# Patient Record
Sex: MALE | Race: BLACK OR AFRICAN AMERICAN | NOT HISPANIC OR LATINO | URBAN - METROPOLITAN AREA
[De-identification: names, ages, dates, MRNs, and addresses within clinical notes are randomized per-mention and may not be internally consistent; named-entity substitution may affect disease eponyms.]

---

## 2024-04-17 ENCOUNTER — OFFICE VISIT (OUTPATIENT)
Dept: PRIMARY CARE | Facility: CLINIC | Age: 29
End: 2024-04-17
Payer: COMMERCIAL

## 2024-04-17 VITALS
RESPIRATION RATE: 12 BRPM | DIASTOLIC BLOOD PRESSURE: 78 MMHG | WEIGHT: 209 LBS | HEART RATE: 52 BPM | HEIGHT: 74 IN | SYSTOLIC BLOOD PRESSURE: 129 MMHG | OXYGEN SATURATION: 98 % | BODY MASS INDEX: 26.82 KG/M2

## 2024-04-17 DIAGNOSIS — M25.562 ACUTE PAIN OF LEFT KNEE: Primary | ICD-10-CM

## 2024-04-17 PROCEDURE — 1036F TOBACCO NON-USER: CPT | Performed by: INTERNAL MEDICINE

## 2024-04-17 PROCEDURE — 99204 OFFICE O/P NEW MOD 45 MIN: CPT | Performed by: INTERNAL MEDICINE

## 2024-04-17 ASSESSMENT — ENCOUNTER SYMPTOMS
LOSS OF SENSATION IN FEET: 0
OCCASIONAL FEELINGS OF UNSTEADINESS: 0
DEPRESSION: 1

## 2024-04-17 NOTE — ASSESSMENT & PLAN NOTE
>>ASSESSMENT AND PLAN FOR ACUTE PAIN OF LEFT KNEE WRITTEN ON 4/17/2024  2:24 PM BY TONY BRIGGS    X-ray of left knee   Will create further plan at upcoming appointment once results are obtained

## 2024-04-17 NOTE — PROGRESS NOTES
"Subjective   Chief complaint: Audi Shepherd is a 28 y.o. male who presents for New Patient Visit (Pt would like a complete physical. Pt states about a month ago he was working out and hurt his left knee , hurts to do squats ).    HPI:  28 y.o. male with no significant medical history reports to the office to establish care. Pt complains of left knee pain for the past month. Was doing a leg press at the gym when he noticed an odd sensation. Pt complains of pain with stairs and load dependent knee activities. Has had to modify exercise. Has not taken any medications for the pain and describes pain as mild. Feels otherwise well. No other complaints.         Objective   /78   Pulse 52   Resp 12   Ht 1.88 m (6' 2\")   Wt 94.8 kg (209 lb)   SpO2 98%   BMI 26.83 kg/m²   Physical Exam  Constitutional:       Appearance: Normal appearance.   HENT:      Head: Normocephalic and atraumatic.      Mouth/Throat:      Mouth: Mucous membranes are moist.      Pharynx: Oropharynx is clear.   Eyes:      Pupils: Pupils are equal, round, and reactive to light.   Cardiovascular:      Rate and Rhythm: Normal rate and regular rhythm.      Heart sounds: Normal heart sounds.   Pulmonary:      Effort: Pulmonary effort is normal. No respiratory distress.      Breath sounds: Normal breath sounds. No wheezing, rhonchi or rales.   Abdominal:      General: Abdomen is flat. There is no distension.   Musculoskeletal:         General: Normal range of motion.      Cervical back: Normal range of motion.      Comments: No pain with active or passive range of motion of left knee. Reproducible pain with valgus load of left knee. No joint effusion, erythema.    Skin:     General: Skin is warm and dry.   Neurological:      General: No focal deficit present.      Mental Status: He is alert.         I have reviewed and reconciled the medication list with the patient today. No current outpatient medications on file.     Imaging:  No results found. " "    Labs reviewed:    No results found for: \"WBC\", \"HGB\", \"HCT\", \"PLT\", \"CHOL\", \"TRIG\", \"HDL\", \"LDLDIRECT\", \"ALT\", \"AST\", \"NA\", \"K\", \"CL\", \"CREATININE\", \"BUN\", \"CO2\", \"TSH\", \"PSA\", \"INR\", \"GLUF\", \"HGBA1C\", \"ALBUR\"    Assessment/Plan   Problem List Items Addressed This Visit          Musculoskeletal and Injuries    Acute pain of left knee - Primary     X-ray of left knee   Will create further plan at upcoming appointment once results are obtained          Relevant Orders    XR knee left 1-2 views       Continue current medications as listed  Follow up in a few weeks for physical visit      "

## 2024-05-02 DIAGNOSIS — E78.00 ELEVATED LDL CHOLESTEROL LEVEL: ICD-10-CM

## 2024-05-02 DIAGNOSIS — Z00.00 ENCOUNTER FOR ANNUAL PHYSICAL EXAM: ICD-10-CM

## 2024-05-02 DIAGNOSIS — I10 PRIMARY HYPERTENSION: ICD-10-CM

## 2024-05-02 DIAGNOSIS — E03.9 HYPOTHYROIDISM, UNSPECIFIED TYPE: ICD-10-CM

## 2024-05-02 DIAGNOSIS — I10 BENIGN ESSENTIAL HYPERTENSION: ICD-10-CM

## 2024-05-02 DIAGNOSIS — D50.9 IRON DEFICIENCY ANEMIA, UNSPECIFIED IRON DEFICIENCY ANEMIA TYPE: ICD-10-CM

## 2024-05-03 ENCOUNTER — LAB (OUTPATIENT)
Dept: LAB | Facility: LAB | Age: 29
End: 2024-05-03
Payer: COMMERCIAL

## 2024-05-03 ENCOUNTER — APPOINTMENT (OUTPATIENT)
Dept: PRIMARY CARE | Facility: CLINIC | Age: 29
End: 2024-05-03
Payer: COMMERCIAL

## 2024-05-03 DIAGNOSIS — Z00.00 ENCOUNTER FOR ANNUAL PHYSICAL EXAM: ICD-10-CM

## 2024-05-03 LAB
25(OH)D3 SERPL-MCNC: 43 NG/ML (ref 30–100)
ALBUMIN SERPL BCP-MCNC: 4.8 G/DL (ref 3.4–5)
ALP SERPL-CCNC: 39 U/L (ref 33–120)
ALT SERPL W P-5'-P-CCNC: 16 U/L (ref 10–52)
ANION GAP SERPL CALC-SCNC: 15 MMOL/L (ref 10–20)
AST SERPL W P-5'-P-CCNC: 17 U/L (ref 9–39)
BILIRUB SERPL-MCNC: 1.1 MG/DL (ref 0–1.2)
BUN SERPL-MCNC: 14 MG/DL (ref 6–23)
CALCIUM SERPL-MCNC: 10.1 MG/DL (ref 8.6–10.6)
CHLORIDE SERPL-SCNC: 102 MMOL/L (ref 98–107)
CHOLEST SERPL-MCNC: 161 MG/DL (ref 0–199)
CHOLESTEROL/HDL RATIO: 2.7
CO2 SERPL-SCNC: 29 MMOL/L (ref 21–32)
CREAT SERPL-MCNC: 1.18 MG/DL (ref 0.5–1.3)
EGFRCR SERPLBLD CKD-EPI 2021: 85 ML/MIN/1.73M*2
ERYTHROCYTE [DISTWIDTH] IN BLOOD BY AUTOMATED COUNT: 12 % (ref 11.5–14.5)
GLUCOSE SERPL-MCNC: 81 MG/DL (ref 74–99)
HCT VFR BLD AUTO: 47.6 % (ref 41–52)
HDLC SERPL-MCNC: 58.7 MG/DL
HGB BLD-MCNC: 14.5 G/DL (ref 13.5–17.5)
LDLC SERPL CALC-MCNC: 92 MG/DL
MCH RBC QN AUTO: 26.4 PG (ref 26–34)
MCHC RBC AUTO-ENTMCNC: 30.5 G/DL (ref 32–36)
MCV RBC AUTO: 87 FL (ref 80–100)
NON HDL CHOLESTEROL: 102 MG/DL (ref 0–149)
NRBC BLD-RTO: 0 /100 WBCS (ref 0–0)
PLATELET # BLD AUTO: 211 X10*3/UL (ref 150–450)
POTASSIUM SERPL-SCNC: 4.2 MMOL/L (ref 3.5–5.3)
PROT SERPL-MCNC: 7.3 G/DL (ref 6.4–8.2)
RBC # BLD AUTO: 5.49 X10*6/UL (ref 4.5–5.9)
SODIUM SERPL-SCNC: 142 MMOL/L (ref 136–145)
TRIGL SERPL-MCNC: 52 MG/DL (ref 0–149)
TSH SERPL-ACNC: 1.71 MIU/L (ref 0.44–3.98)
VLDL: 10 MG/DL (ref 0–40)
WBC # BLD AUTO: 5.6 X10*3/UL (ref 4.4–11.3)

## 2024-05-03 PROCEDURE — 84443 ASSAY THYROID STIM HORMONE: CPT

## 2024-05-03 PROCEDURE — 85027 COMPLETE CBC AUTOMATED: CPT

## 2024-05-03 PROCEDURE — 82306 VITAMIN D 25 HYDROXY: CPT

## 2024-05-03 PROCEDURE — 36415 COLL VENOUS BLD VENIPUNCTURE: CPT

## 2024-05-03 PROCEDURE — 80061 LIPID PANEL: CPT

## 2024-05-03 PROCEDURE — 80053 COMPREHEN METABOLIC PANEL: CPT

## 2024-05-09 ENCOUNTER — APPOINTMENT (OUTPATIENT)
Dept: PRIMARY CARE | Facility: CLINIC | Age: 29
End: 2024-05-09
Payer: COMMERCIAL

## 2024-05-10 ENCOUNTER — HOSPITAL ENCOUNTER (OUTPATIENT)
Dept: RADIOLOGY | Facility: CLINIC | Age: 29
Discharge: HOME | End: 2024-05-10
Payer: COMMERCIAL

## 2024-05-10 DIAGNOSIS — M25.562 ACUTE PAIN OF LEFT KNEE: ICD-10-CM

## 2024-05-10 PROCEDURE — 73562 X-RAY EXAM OF KNEE 3: CPT | Mod: LT

## 2024-05-10 PROCEDURE — 73562 X-RAY EXAM OF KNEE 3: CPT | Mod: LEFT SIDE | Performed by: RADIOLOGY

## 2024-05-10 ASSESSMENT — PROMIS GLOBAL HEALTH SCALE
RATE_QUALITY_OF_LIFE: VERY GOOD
RATE_MENTAL_HEALTH: GOOD
EMOTIONAL_PROBLEMS: SOMETIMES
CARRYOUT_PHYSICAL_ACTIVITIES: COMPLETELY
RATE_GENERAL_HEALTH: VERY GOOD
RATE_AVERAGE_PAIN: 2
RATE_PHYSICAL_HEALTH: EXCELLENT
RATE_SOCIAL_SATISFACTION: VERY GOOD
RATE_AVERAGE_FATIGUE: MILD
CARRYOUT_SOCIAL_ACTIVITIES: VERY GOOD

## 2024-05-14 ENCOUNTER — OFFICE VISIT (OUTPATIENT)
Dept: PRIMARY CARE | Facility: CLINIC | Age: 29
End: 2024-05-14
Payer: COMMERCIAL

## 2024-05-14 VITALS
BODY MASS INDEX: 26.69 KG/M2 | RESPIRATION RATE: 14 BRPM | HEART RATE: 78 BPM | OXYGEN SATURATION: 94 % | HEIGHT: 74 IN | WEIGHT: 208 LBS | DIASTOLIC BLOOD PRESSURE: 86 MMHG | SYSTOLIC BLOOD PRESSURE: 136 MMHG

## 2024-05-14 DIAGNOSIS — Z00.00 ANNUAL PHYSICAL EXAM: ICD-10-CM

## 2024-05-14 DIAGNOSIS — Z29.9 PREVENTIVE MEASURE: Primary | ICD-10-CM

## 2024-05-14 DIAGNOSIS — M25.562 ACUTE PAIN OF LEFT KNEE: ICD-10-CM

## 2024-05-14 PROBLEM — M25.561 ACUTE PAIN OF RIGHT KNEE: Status: ACTIVE | Noted: 2021-03-16

## 2024-05-14 PROBLEM — M25.569 KNEE PAIN: Status: ACTIVE | Noted: 2024-04-17

## 2024-05-14 LAB
POC APPEARANCE, URINE: CLEAR
POC BILIRUBIN, URINE: NEGATIVE
POC BLOOD, URINE: NEGATIVE
POC COLOR, URINE: YELLOW
POC GLUCOSE, URINE: NEGATIVE MG/DL
POC KETONES, URINE: NEGATIVE MG/DL
POC LEUKOCYTES, URINE: NEGATIVE
POC NITRITE,URINE: NEGATIVE
POC PH, URINE: 6.5 PH
POC PROTEIN, URINE: NEGATIVE MG/DL
POC SPECIFIC GRAVITY, URINE: 1.01
POC UROBILINOGEN, URINE: 0.2 EU/DL

## 2024-05-14 PROCEDURE — 1036F TOBACCO NON-USER: CPT | Performed by: INTERNAL MEDICINE

## 2024-05-14 PROCEDURE — 99395 PREV VISIT EST AGE 18-39: CPT | Performed by: INTERNAL MEDICINE

## 2024-05-14 PROCEDURE — 81002 URINALYSIS NONAUTO W/O SCOPE: CPT | Performed by: INTERNAL MEDICINE

## 2024-05-14 PROCEDURE — 99214 OFFICE O/P EST MOD 30 MIN: CPT | Performed by: INTERNAL MEDICINE

## 2024-05-14 RX ORDER — MELOXICAM 7.5 MG/1
7.5 TABLET ORAL 2 TIMES DAILY
Qty: 60 TABLET | Refills: 11 | Status: SHIPPED | OUTPATIENT
Start: 2024-05-14 | End: 2025-05-14

## 2024-05-14 RX ORDER — EPINEPHRINE 0.3 MG/.3ML
INJECTION SUBCUTANEOUS
COMMUNITY
Start: 2023-03-20

## 2024-05-14 RX ORDER — DICLOFENAC SODIUM 10 MG/G
4 GEL TOPICAL 4 TIMES DAILY
Qty: 100 G | Refills: 1 | Status: SHIPPED | OUTPATIENT
Start: 2024-05-14

## 2024-05-14 ASSESSMENT — COLUMBIA-SUICIDE SEVERITY RATING SCALE - C-SSRS
1. IN THE PAST MONTH, HAVE YOU WISHED YOU WERE DEAD OR WISHED YOU COULD GO TO SLEEP AND NOT WAKE UP?: NO
6. HAVE YOU EVER DONE ANYTHING, STARTED TO DO ANYTHING, OR PREPARED TO DO ANYTHING TO END YOUR LIFE?: NO
2. HAVE YOU ACTUALLY HAD ANY THOUGHTS OF KILLING YOURSELF?: NO

## 2024-05-14 ASSESSMENT — LIFESTYLE VARIABLES
HOW MANY STANDARD DRINKS CONTAINING ALCOHOL DO YOU HAVE ON A TYPICAL DAY: PATIENT DOES NOT DRINK
SKIP TO QUESTIONS 9-10: 1
HOW OFTEN DO YOU HAVE A DRINK CONTAINING ALCOHOL: NEVER
AUDIT-C TOTAL SCORE: 0
HOW OFTEN DO YOU HAVE SIX OR MORE DRINKS ON ONE OCCASION: NEVER

## 2024-05-14 ASSESSMENT — ANXIETY QUESTIONNAIRES
4. TROUBLE RELAXING: NOT AT ALL
IF YOU CHECKED OFF ANY PROBLEMS ON THIS QUESTIONNAIRE, HOW DIFFICULT HAVE THESE PROBLEMS MADE IT FOR YOU TO DO YOUR WORK, TAKE CARE OF THINGS AT HOME, OR GET ALONG WITH OTHER PEOPLE: NOT DIFFICULT AT ALL
2. NOT BEING ABLE TO STOP OR CONTROL WORRYING: NOT AT ALL
3. WORRYING TOO MUCH ABOUT DIFFERENT THINGS: NOT AT ALL
GAD7 TOTAL SCORE: 0
1. FEELING NERVOUS, ANXIOUS, OR ON EDGE: NOT AT ALL
6. BECOMING EASILY ANNOYED OR IRRITABLE: NOT AT ALL
5. BEING SO RESTLESS THAT IT IS HARD TO SIT STILL: NOT AT ALL
7. FEELING AFRAID AS IF SOMETHING AWFUL MIGHT HAPPEN: NOT AT ALL

## 2024-05-14 ASSESSMENT — SOCIAL DETERMINANTS OF HEALTH (SDOH)

## 2024-05-14 ASSESSMENT — PATIENT HEALTH QUESTIONNAIRE - PHQ9
1. LITTLE INTEREST OR PLEASURE IN DOING THINGS: NOT AT ALL
2. FEELING DOWN, DEPRESSED OR HOPELESS: NOT AT ALL
SUM OF ALL RESPONSES TO PHQ9 QUESTIONS 1 & 2: 0

## 2024-05-14 ASSESSMENT — ENCOUNTER SYMPTOMS
DEPRESSION: 0
OCCASIONAL FEELINGS OF UNSTEADINESS: 0
LOSS OF SENSATION IN FEET: 0

## 2024-05-14 ASSESSMENT — PAIN SCALES - GENERAL: PAINLEVEL: 2

## 2024-05-14 NOTE — PROGRESS NOTES
"ANNUAL WELLNESS VISIT    Subjective :  Chief Complaint: Audi Shepherd is an 28 y.o. male here for an annual wellness visit and general medical care and f/u.     HPI:  28 yr old male here for an annual. Pt has been having left knee pain that started 2 months ago when training. He has tried taking tyenol for it, but it has not helped much. Pain worse with movement, rating it as 6/10 when walking on the stairs. He would also like to get a full STD testing panel done today.         Objective   /86   Pulse 78   Resp 14   Ht 1.88 m (6' 2\")   Wt 94.3 kg (208 lb)   SpO2 94%   BMI 26.71 kg/m²     Physical Exam  Constitutional:       General: He is not in acute distress.  Eyes:      Extraocular Movements: Extraocular movements intact.   Cardiovascular:      Rate and Rhythm: Normal rate and regular rhythm.   Pulmonary:      Effort: Pulmonary effort is normal.      Breath sounds: Normal breath sounds.   Abdominal:      General: Bowel sounds are normal.      Palpations: Abdomen is soft.   Musculoskeletal:      Cervical back: Neck supple.      Right lower leg: No edema.      Left lower leg: No edema.   Neurological:      Mental Status: He is alert.   Psychiatric:         Mood and Affect: Mood normal.         Behavior: Behavior is cooperative.         Imaging:  XR knee left 3 views    Result Date: 5/10/2024  Interpreted By:  Cristino Aguayo, STUDY: Left knee dated  5/10/2024.   INDICATION: Signs/Symptoms:left knee pain   COMPARISON: None.   ACCESSION NUMBER(S): [id number]   ORDERING CLINICIAN: JOSE EVANS   TECHNIQUE: Three views of the left knee.   FINDINGS: No fracture or dislocation is evident.  No knee effusion is evident. There is minimal lateral femorotibial and mild patellofemoral degenerative change. No soft tissue gas or radiopaque foreign body is evident.       Degenerative changes without osseous injury evident.   MACRO: None   Signed by: Cristino Aguayo 5/10/2024 10:49 AM Dictation workstation:   " [id number]       Labs reviewed:    Lab Results   Component Value Date    WBC 5.6 05/03/2024    HGB 14.5 05/03/2024    HCT 47.6 05/03/2024     05/03/2024    CHOL 161 05/03/2024    TRIG 52 05/03/2024    HDL 58.7 05/03/2024    ALT 16 05/03/2024    AST 17 05/03/2024     05/03/2024    K 4.2 05/03/2024     05/03/2024    CREATININE 1.18 05/03/2024    BUN 14 05/03/2024    CO2 29 05/03/2024    TSH 1.71 05/03/2024    HGBA1C <4.2 (L) 03/16/2021       Past Medical, Surgical, and Family History reviewed and updated in chart.    I have reviewed and reconciled the medication list with the patient today.   Current Outpatient Medications:     EPINEPHrine 0.3 mg/0.3 mL injection syringe, Inject 0.3 mL (0.3 mg total) into the mid-thigh as needed for severe allergic reaction. ., Disp: , Rfl:      List of current healthcare providers:  Patient Care Team:  Tomás Zapata MD as PCP - General (Internal Medicine)     HRA:  Over the past 2 weeks, how often have you been bothered by any of the following problems?  Little interest or pleasure in doing things: Not at all  Feeling down, depressed, or hopeless: Not at all  Patient Health Questionnaire-2 Score: 0    Steadi Fall Risk  One or more falls in the last year? No  How many Times?    Was the patient injured in the fall?    Has trouble stepping onto curb? No  Advised to use a cane or walker to get around safely? No  Often has to rush to toilet? No  Feels unsteady when walking? No  Has lost some feeling in feet? No  Often feels sad or depressed? No  Steadies self on furniture while walking at home? No  Takes medicine that makes them feel lightheaded or more tired than usual? No  Worried about Falling? No  Takes medicine to sleep or improve mood? No  Needs to push with hands when rising from a chair? No                                          Assessment/Plan :  Problem List Items Addressed This Visit       Acute pain of left knee     Pt given knee exercises    7.5 mg  Meloxicam 3x/day   Apply Voltaren gel to areas of tenderness           Other Visit Diagnoses       Annual physical exam        Relevant Orders    POCT UA (nonautomated) manually resulted (Completed)          The following health maintenance schedule was reviewed with the patient and provided in printed form in the after visit summary:  Health Maintenance   Topic Date Due    HIV Screening  Never done    Varicella Vaccines (2 of 2 - 2-dose childhood series) 08/19/1999    Hepatitis C Screening  Never done    Hepatitis B Vaccines (1 of 3 - 19+ 3-dose series) Never done    COVID-19 Vaccine (3 - 2023-24 season) 09/01/2023    Influenza Vaccine (Season Ended) 09/01/2024    TSH Level  05/03/2025    Yearly Adult Physical  05/15/2025    Lipid Panel  05/03/2029    DTaP/Tdap/Td Vaccines (4 - Td or Tdap) 02/08/2032    Zoster Vaccines (1 of 2) 08/18/2043    MMR Vaccines  Completed    HIB Vaccines  Aged Out    IPV Vaccines  Aged Out    Hepatitis A Vaccines  Aged Out    Meningococcal Vaccine  Aged Out    Rotavirus Vaccines  Aged Out    HPV Vaccines  Aged Out    Pneumococcal Vaccine: Pediatrics (0 to 5 Years) and At-Risk Patients (6 to 64 Years)  Aged Out       Advance Care Planning   No living will             Orders Placed This Encounter   Procedures    POCT UA (nonautomated) manually resulted     Order Specific Question:   Release result to [employer]     Answer:   Immediate [1]       Continue current medications as listed  Follow up in 3 months

## 2024-05-14 NOTE — PROGRESS NOTES
"ANNUAL WELLNESS VISIT    Subjective :  Chief Complaint: Audi Shepherd is an 28 y.o. male here for an annual wellness visit and general medical care and f/u.     HPI:  28 yr old male here for an annual. Pt has been having left knee pain that started 2 months ago when training. He has tried taking tyenol for it, but it has not helped much. Pain worse with movement, rating it as 6/10 when walking on the stairs. He would also like to get a full STD testing panel done today.         Objective   /86   Pulse 78   Resp 14   Ht 1.88 m (6' 2\")   Wt 94.3 kg (208 lb)   SpO2 94%   BMI 26.71 kg/m²     Physical Exam  Constitutional:       General: He is not in acute distress.  Eyes:      Extraocular Movements: Extraocular movements intact.   Cardiovascular:      Rate and Rhythm: Normal rate and regular rhythm.   Pulmonary:      Effort: Pulmonary effort is normal.      Breath sounds: Normal breath sounds.   Abdominal:      General: Bowel sounds are normal.      Palpations: Abdomen is soft.   Musculoskeletal:      Cervical back: Neck supple.      Right lower leg: No edema.      Left lower leg: No edema.   Neurological:      Mental Status: He is alert.   Psychiatric:         Mood and Affect: Mood normal.         Behavior: Behavior is cooperative.         Imaging:  XR knee left 3 views    Result Date: 5/10/2024  Interpreted By:  Cristino Aguayo, STUDY: Left knee dated  5/10/2024.   INDICATION: Signs/Symptoms:left knee pain   COMPARISON: None.   ACCESSION NUMBER(S): [id number]   ORDERING CLINICIAN: JOSE EVANS   TECHNIQUE: Three views of the left knee.   FINDINGS: No fracture or dislocation is evident.  No knee effusion is evident. There is minimal lateral femorotibial and mild patellofemoral degenerative change. No soft tissue gas or radiopaque foreign body is evident.       Degenerative changes without osseous injury evident.   MACRO: None   Signed by: Cristino Aguayo 5/10/2024 10:49 AM Dictation workstation:   " [id number]       Labs reviewed:    Lab Results   Component Value Date    WBC 5.6 05/03/2024    HGB 14.5 05/03/2024    HCT 47.6 05/03/2024     05/03/2024    CHOL 161 05/03/2024    TRIG 52 05/03/2024    HDL 58.7 05/03/2024    ALT 16 05/03/2024    AST 17 05/03/2024     05/03/2024    K 4.2 05/03/2024     05/03/2024    CREATININE 1.18 05/03/2024    BUN 14 05/03/2024    CO2 29 05/03/2024    TSH 1.71 05/03/2024    HGBA1C <4.2 (L) 03/16/2021       Past Medical, Surgical, and Family History reviewed and updated in chart.    I have reviewed and reconciled the medication list with the patient today.   Current Outpatient Medications:     EPINEPHrine 0.3 mg/0.3 mL injection syringe, Inject 0.3 mL (0.3 mg total) into the mid-thigh as needed for severe allergic reaction. ., Disp: , Rfl:      List of current healthcare providers:  Patient Care Team:  Tomás Zapata MD as PCP - General (Internal Medicine)     HRA:  Over the past 2 weeks, how often have you been bothered by any of the following problems?  Little interest or pleasure in doing things: Not at all  Feeling down, depressed, or hopeless: Not at all  Patient Health Questionnaire-2 Score: 0    Steadi Fall Risk  One or more falls in the last year? No  How many Times?    Was the patient injured in the fall?    Has trouble stepping onto curb? No  Advised to use a cane or walker to get around safely? No  Often has to rush to toilet? No  Feels unsteady when walking? No  Has lost some feeling in feet? No  Often feels sad or depressed? No  Steadies self on furniture while walking at home? No  Takes medicine that makes them feel lightheaded or more tired than usual? No  Worried about Falling? No  Takes medicine to sleep or improve mood? No  Needs to push with hands when rising from a chair? No                                          Assessment/Plan :  Problem List Items Addressed This Visit       Acute pain of left knee     Pt given knee exercises    7.5 mg  Meloxicam 3x/day   Apply Voltaren gel to areas of tenderness          Preventive measure - Primary     Full STD panel          Other Visit Diagnoses       Annual physical exam        Relevant Orders    POCT UA (nonautomated) manually resulted (Completed)          The following health maintenance schedule was reviewed with the patient and provided in printed form in the after visit summary:  Health Maintenance   Topic Date Due    HIV Screening  Never done    Varicella Vaccines (2 of 2 - 2-dose childhood series) 08/19/1999    Hepatitis C Screening  Never done    Hepatitis B Vaccines (1 of 3 - 19+ 3-dose series) Never done    COVID-19 Vaccine (3 - 2023-24 season) 09/01/2023    Influenza Vaccine (Season Ended) 09/01/2024    TSH Level  05/03/2025    Yearly Adult Physical  05/15/2025    Lipid Panel  05/03/2029    DTaP/Tdap/Td Vaccines (4 - Td or Tdap) 02/08/2032    Zoster Vaccines (1 of 2) 08/18/2043    MMR Vaccines  Completed    HIB Vaccines  Aged Out    IPV Vaccines  Aged Out    Hepatitis A Vaccines  Aged Out    Meningococcal Vaccine  Aged Out    Rotavirus Vaccines  Aged Out    HPV Vaccines  Aged Out    Pneumococcal Vaccine: Pediatrics (0 to 5 Years) and At-Risk Patients (6 to 64 Years)  Aged Out       Advance Care Planning   No living will             Orders Placed This Encounter   Procedures    POCT UA (nonautomated) manually resulted     Order Specific Question:   Release result to [employer]     Answer:   Immediate [1]       Continue current medications as listed  Follow up in 3 months

## 2024-05-14 NOTE — ASSESSMENT & PLAN NOTE
>>ASSESSMENT AND PLAN FOR ACUTE PAIN OF LEFT KNEE WRITTEN ON 5/14/2024  3:43 PM BY MERRILL PARSON    Pt given knee exercises    7.5 mg Meloxicam 3x/day   Apply Voltaren gel to areas of tenderness